# Patient Record
Sex: FEMALE | Race: WHITE | NOT HISPANIC OR LATINO | Employment: PART TIME
[De-identification: names, ages, dates, MRNs, and addresses within clinical notes are randomized per-mention and may not be internally consistent; named-entity substitution may affect disease eponyms.]

---

## 2024-09-14 ENCOUNTER — HEALTH MAINTENANCE LETTER (OUTPATIENT)
Age: 24
End: 2024-09-14

## 2024-09-23 ENCOUNTER — OFFICE VISIT (OUTPATIENT)
Dept: FAMILY MEDICINE | Facility: CLINIC | Age: 24
End: 2024-09-23
Payer: COMMERCIAL

## 2024-09-23 ENCOUNTER — MYC MEDICAL ADVICE (OUTPATIENT)
Dept: FAMILY MEDICINE | Facility: CLINIC | Age: 24
End: 2024-09-23

## 2024-09-23 VITALS
BODY MASS INDEX: 22.37 KG/M2 | DIASTOLIC BLOOD PRESSURE: 73 MMHG | RESPIRATION RATE: 16 BRPM | SYSTOLIC BLOOD PRESSURE: 116 MMHG | HEART RATE: 75 BPM | WEIGHT: 139.2 LBS | HEIGHT: 66 IN | OXYGEN SATURATION: 99 % | TEMPERATURE: 98.1 F

## 2024-09-23 DIAGNOSIS — R30.0 DYSURIA: ICD-10-CM

## 2024-09-23 DIAGNOSIS — N76.0 BACTERIAL VAGINOSIS: ICD-10-CM

## 2024-09-23 DIAGNOSIS — B96.89 BACTERIAL VAGINOSIS: ICD-10-CM

## 2024-09-23 DIAGNOSIS — Z12.4 CERVICAL CANCER SCREENING: ICD-10-CM

## 2024-09-23 DIAGNOSIS — Z00.00 ROUTINE GENERAL MEDICAL EXAMINATION AT A HEALTH CARE FACILITY: Primary | ICD-10-CM

## 2024-09-23 PROBLEM — H53.413: Status: ACTIVE | Noted: 2021-09-28

## 2024-09-23 LAB
ALBUMIN UR-MCNC: NEGATIVE MG/DL
APPEARANCE UR: CLEAR
BACTERIA #/AREA URNS HPF: ABNORMAL /HPF
BILIRUB UR QL STRIP: NEGATIVE
CLUE CELLS: PRESENT
COLOR UR AUTO: YELLOW
GLUCOSE UR STRIP-MCNC: NEGATIVE MG/DL
HGB UR QL STRIP: NEGATIVE
KETONES UR STRIP-MCNC: NEGATIVE MG/DL
LEUKOCYTE ESTERASE UR QL STRIP: NEGATIVE
MUCOUS THREADS #/AREA URNS LPF: PRESENT /LPF
NITRATE UR QL: POSITIVE
PH UR STRIP: 7.5 [PH] (ref 5–7)
RBC #/AREA URNS AUTO: ABNORMAL /HPF
SP GR UR STRIP: 1.02 (ref 1–1.03)
SQUAMOUS #/AREA URNS AUTO: ABNORMAL /LPF
TRICHOMONAS, WET PREP: ABNORMAL
UROBILINOGEN UR STRIP-ACNC: 0.2 E.U./DL
WBC #/AREA URNS AUTO: ABNORMAL /HPF
WBC CLUMPS #/AREA URNS HPF: PRESENT /HPF
WBC'S/HIGH POWER FIELD, WET PREP: ABNORMAL
YEAST, WET PREP: ABNORMAL

## 2024-09-23 PROCEDURE — 87086 URINE CULTURE/COLONY COUNT: CPT | Performed by: FAMILY MEDICINE

## 2024-09-23 PROCEDURE — 87210 SMEAR WET MOUNT SALINE/INK: CPT | Performed by: FAMILY MEDICINE

## 2024-09-23 PROCEDURE — G0145 SCR C/V CYTO,THINLAYER,RESCR: HCPCS | Performed by: FAMILY MEDICINE

## 2024-09-23 PROCEDURE — 81001 URINALYSIS AUTO W/SCOPE: CPT | Performed by: FAMILY MEDICINE

## 2024-09-23 PROCEDURE — 99385 PREV VISIT NEW AGE 18-39: CPT | Performed by: FAMILY MEDICINE

## 2024-09-23 ASSESSMENT — PAIN SCALES - GENERAL: PAINLEVEL: NO PAIN (0)

## 2024-09-23 NOTE — PROGRESS NOTES
Preventive Care Visit  [facility]  Jaswant Cano , Family Medicine  Sep 23, 2024      Assessment & Plan     Routine general medical examination at a health care facility  -Deferred HIV, hep C and vaccinations today. Will get at another time. Hx of needle phobia.    Cervical cancer screening  - Pap Screen Reflex to HPV if ASCUS - Recommended Age 25 - 29 Years    Dysuria  -Symptoms started yesterday. Discussed UA and wet prep. Hx of low level urinary/genital discomfort, discussed could be interstitial cystitis symptoms. Discussed GC/Chl screen today as well, deferred by patient. Consider urology referral if work-up negative and symptoms persistent.  - UA Macroscopic with reflex to Microscopic and Culture - Lab Collect  - Wet prep - Clinic Collect              Counseling  Appropriate preventive services were addressed with this patient via screening, questionnaire, or discussion as appropriate for fall prevention, nutrition, physical activity, Tobacco-use cessation, social engagement, weight loss and cognition.  Checklist reviewing preventive services available has been given to the patient.  Reviewed patient's diet, addressing concerns and/or questions.   She is at risk for psychosocial distress and has been provided with information to reduce risk.           Debo Monreal is a 24 year old, presenting for the following:  Physical (Urgency for urination.)        9/23/2024     7:33 AM   Additional Questions   Roomed by Rachel OVERTON        Health Care Directive  Patient does not have a Health Care Directive or Living Will: Discussed advance care planning with patient; information given to patient to review.    HPI    Other concerns:  Yesterday started having slight urinary urgency and pain. Hx of similar 4 years but self resolved. No abnormal vaginal discharge. Sexually active, one partner. States sometimes feels like there is always low level of urinary discomfort. No genital  ig.                9/22/2024   General Health   How would you rate your overall physical health? Excellent   Feel stress (tense, anxious, or unable to sleep) Only a little      (!) STRESS CONCERN      9/22/2024   Nutrition   Three or more servings of calcium each day? Yes   Diet: Other   If other, please elaborate: Dairy free   How many servings of fruit and vegetables per day? 4 or more   How many sweetened beverages each day? 0-1            9/22/2024   Exercise   Days per week of moderate/strenous exercise 6 days   Average minutes spent exercising at this level 70 min            9/22/2024   Social Factors   Frequency of gathering with friends or relatives Twice a week   Worry food won't last until get money to buy more No   Food not last or not have enough money for food? No   Do you have housing? (Housing is defined as stable permanent housing and does not include staying ouside in a car, in a tent, in an abandoned building, in an overnight shelter, or couch-surfing.) Yes   Are you worried about losing your housing? No   Lack of transportation? No   Unable to get utilities (heat,electricity)? No            9/22/2024   Dental   Dentist two times every year? Yes            9/22/2024   TB Screening   Were you born outside of the [place]? No            Today's PHQ-2 Score:       9/22/2024    12:59 PM   PHQ-2 ( 1999 [employer])   Q1: Little interest or pleasure in doing things 0   Q2: Feeling down, depressed or hopeless 0   PHQ-2 Score 0   Q1: Little interest or pleasure in doing things Not at all   Q2: Feeling down, depressed or hopeless Not at all   PHQ-2 Score 0           9/22/2024   Substance Use   Alcohol more than 3/day or more than 7/wk Not Applicable   Do you use any other substances recreationally? No        Social History     Tobacco Use    Smoking status: Never    Smokeless tobacco: Never   Vaping Use    Vaping status: Never Used   Substance Use Topics    Alcohol use: Never    Drug use: Never                     "9/22/2024   One time HIV Screening   Previous HIV test? Yes          9/22/2024   STI Screening   New sexual partner(s) since last STI/HIV test? No        History of abnormal Pap smear: No - age 21-29 PAP every 3 years recommended             9/22/2024   Contraception/Family Planning   Questions about contraception or family planning No           Reviewed and updated as needed this visit by Provider   Tobacco   Meds    Surg Hx  Fam Hx  Soc Hx Sexual Activity                   Objective    Exam  /73   Pulse 75   Temp 98.1  F (36.7  C) (Temporal)   Resp 16   Ht 1.673 m (5' 5.87\")   Wt 63.1 kg (139 lb 3.2 oz)   LMP 09/14/2024   SpO2 99%   BMI 22.56 kg/m     Estimated body mass index is 22.56 kg/m  as calculated from the following:    Height as of this encounter: 1.673 m (5' 5.87\").    Weight as of this encounter: 63.1 kg (139 lb 3.2 oz).    Physical Exam  GENERAL: alert and no distress  EYES: Eyes grossly normal to inspection, PERRL and conjunctivae and sclerae normal  HENT: ear canals and TM's normal, nose and mouth without ulcers or lesions  NECK: no adenopathy, no asymmetry, masses, or scars  RESP: lungs clear to auscultation - no rales, rhonchi or wheezes  CV: regular rate and rhythm, normal S1 S2, no S3 or S4, no murmur, click or rub, no peripheral edema  ABDOMEN: soft, nontender, no hepatosplenomegaly, no masses and bowel sounds normal   (female): normal female external genitalia, normal urethral meatus , normal vaginal mucosa, and normal cervix. No genital rash or signs of irritation.  MS: no gross musculoskeletal defects noted, no edema  SKIN: no suspicious lesions or rashes  NEURO: Normal strength and tone, mentation intact and speech normal  PSYCH: mentation appears normal, affect normal/bright        Signed Electronically by: Jaswant Cano DO    "

## 2024-09-23 NOTE — PATIENT INSTRUCTIONS
Patient Education   Preventive Care Advice   This is general advice given by our system to help you stay healthy. However, your care team may have specific advice just for you. Please talk to your care team about your preventive care needs.  Nutrition  Eat 5 or more servings of fruits and vegetables each day.  Try wheat bread, brown rice and whole grain pasta (instead of white bread, rice, and pasta).  Get enough calcium and vitamin D. Check the label on foods and aim for 100% of the RDA (recommended daily allowance).  Lifestyle  Exercise at least 150 minutes each week  (30 minutes a day, 5 days a week).  Do muscle strengthening activities 2 days a week. These help control your weight and prevent disease.  No smoking.  Wear sunscreen to prevent skin cancer.  Have a dental exam and cleaning every 6 months.  Yearly exams  See your health care team every year to talk about:  Any changes in your health.  Any medicines your care team has prescribed.  Preventive care, family planning, and ways to prevent chronic diseases.  Shots (vaccines)   HPV shots (up to age 26), if you've never had them before.  Hepatitis B shots (up to age 59), if you've never had them before.  COVID-19 shot: Get this shot when it's due.  Flu shot: Get a flu shot every year.  Tetanus shot: Get a tetanus shot every 10 years.  Pneumococcal, hepatitis A, and RSV shots: Ask your care team if you need these based on your risk.  Shingles shot (for age 50 and up)  General health tests  Diabetes screening:  Starting at age 35, Get screened for diabetes at least every 3 years.  If you are younger than age 35, ask your care team if you should be screened for diabetes.  Cholesterol test: At age 39, start having a cholesterol test every 5 years, or more often if advised.  Bone density scan (DEXA): At age 50, ask your care team if you should have this scan for osteoporosis (brittle bones).  Hepatitis C: Get tested at least once in your life.  STIs (sexually  transmitted infections)  Before age 24: Ask your care team if you should be screened for STIs.  After age 24: Get screened for STIs if you're at risk. You are at risk for STIs (including HIV) if:  You are sexually active with more than one person.  You don't use condoms every time.  You or a partner was diagnosed with a sexually transmitted infection.  If you are at risk for HIV, ask about PrEP medicine to prevent HIV.  Get tested for HIV at least once in your life, whether you are at risk for HIV or not.  Cancer screening tests  Cervical cancer screening: If you have a cervix, begin getting regular cervical cancer screening tests starting at age 21.  Breast cancer scan (mammogram): If you've ever had breasts, begin having regular mammograms starting at age 40. This is a scan to check for breast cancer.  Colon cancer screening: It is important to start screening for colon cancer at age 45.  Have a colonoscopy test every 10 years (or more often if you're at risk) Or, ask your provider about stool tests like a FIT test every year or Cologuard test every 3 years.  To learn more about your testing options, visit:   .  For help making a decision, visit:   https://bit.ly/bb54962.  Prostate cancer screening test: If you have a prostate, ask your care team if a prostate cancer screening test (PSA) at age 55 is right for you.  Lung cancer screening: If you are a current or former smoker ages 50 to 80, ask your care team if ongoing lung cancer screenings are right for you.  For informational purposes only. Not to replace the advice of your health care provider. Copyright   2023 [facility]. All rights reserved. Clinically reviewed by the [facility]. [employer] [id number] - REV 01/24.       Thank you for coming to see me today! Here are a couple of pieces of information about my schedule and communication practices.    I am not in the clinic on Fridays. Non-urgent calls and messages received  on Fridays will be addressed as soon as I am able when I am back in the office on the next business day. Urgent calls will be addressed by a covering clinician.      If lab work was done today as part of your evaluation you will generally be contacted via Dr. Tariff, mail, or phone with the results within 7-10 days.  If there is an alarming/concerning result we will contact you by phone. Lab results come back at varying times, I generally wait until all labs are resulted before making comments on results. Please note, labs are automatically released to Dr. Tariff once available, but it may take a couple of days for my interpretation note to appear.    I try very hard to respond to medical messages with 2 business days of receiving them. Occasionally it takes me longer if I am trying to figure out the best way to respond and need to seek guidance, do some research or dig deeper into your medical history to come up with a helpful response.    If you need refills please contact your pharmacist. They will send a refill request to me to review. Please allow 3-5 business days for us to respond to all refill requests.    Call or send a medical message with any questions or concerns. Please understand that I may ask you to schedule a follow-up appointment to properly address your questions or concerns.    Thank you for trusting me to be part of your healthcare team!    Jaswant Cano, DO

## 2024-09-24 ENCOUNTER — TELEPHONE (OUTPATIENT)
Dept: FAMILY MEDICINE | Facility: CLINIC | Age: 24
End: 2024-09-24
Payer: COMMERCIAL

## 2024-09-24 DIAGNOSIS — N76.0 BACTERIAL VAGINOSIS: Primary | ICD-10-CM

## 2024-09-24 DIAGNOSIS — B96.89 BACTERIAL VAGINOSIS: Primary | ICD-10-CM

## 2024-09-24 LAB — BACTERIA UR CULT: NORMAL

## 2024-09-24 RX ORDER — METRONIDAZOLE 500 MG/1
500 TABLET ORAL 2 TIMES DAILY
Qty: 14 TABLET | Refills: 0 | Status: SHIPPED | OUTPATIENT
Start: 2024-09-24 | End: 2024-10-01

## 2024-09-24 RX ORDER — METRONIDAZOLE 500 MG/1
500 TABLET ORAL 2 TIMES DAILY
Qty: 14 TABLET | Refills: 0 | Status: CANCELLED | OUTPATIENT
Start: 2024-09-24 | End: 2024-10-01

## 2024-09-24 NOTE — TELEPHONE ENCOUNTER
Writer called and spoke with patient. Relayed message from Dr. Cano regarding lab results.   Patient would like script sent to the [facility].     ----- Message from Jaswant Cano sent at 9/24/2024 12:18 PM CDT -----  Pls call patient.  UA negative for UTI.  Wet prep positive for BV. I will send in abx. Current pharmacy listed cannot have scripts e-prescribed. Pls have pt select new pharmacy for prescription.    Thank you,   Lorraine Mcgill, ESPERANZAN RN  [facility]

## 2024-09-25 NOTE — TELEPHONE ENCOUNTER
Writer replied to patient via [employer].  ESPERANZA KramerN, RN (she/her)  [facility] RN

## 2024-09-26 LAB
BKR LAB AP GYN ADEQUACY: NORMAL
BKR LAB AP GYN INTERPRETATION: NORMAL
BKR LAB AP HPV REFLEX: NORMAL
BKR LAB AP LMP: NORMAL
BKR LAB AP PREVIOUS ABNORMAL: NORMAL
PATH REPORT.COMMENTS IMP SPEC: NORMAL
PATH REPORT.COMMENTS IMP SPEC: NORMAL
PATH REPORT.RELEVANT HX SPEC: NORMAL

## 2024-09-30 ENCOUNTER — TELEPHONE (OUTPATIENT)
Dept: FAMILY MEDICINE | Facility: CLINIC | Age: 24
End: 2024-09-30
Payer: COMMERCIAL

## 2024-09-30 NOTE — TELEPHONE ENCOUNTER
Yes can certainly try that.   If symptoms persist she should be re-evaluated to make sure no associated STI as she declined testing    Ronit Luevano DNP

## 2024-09-30 NOTE — TELEPHONE ENCOUNTER
Covering clinicians-Please review and advise if it would be reasonable for patient to try over-the-counter A&D ointment to local irritation/discomfort?    Call received from patient:  On day 7 of antibiotics for bacterial vaginosis  Symptoms have improved and still present  Constant burning and discomfort  No bleeding  No discharge  Wondering if needs more antibiotic?  Is there anything she can use topically?    Writer discussed with patient it is reassuring symptoms are improved; antibiotic will continue to circulate in the body even after the last dose.  If, at any point, symptoms are worsening, call back and ask to speak with a nurse.  If symptoms fail to resolve by end of this week, call back to speak with a nurse.    Writer will ask covering clinicians if over-the-counter A&D ointment would be an option for local topical application.    Patient verbalized understanding and in agreement with plan.    Thank you!  ESPERANZA DomínguezN, RN-[facility]

## 2024-10-08 ENCOUNTER — NURSE TRIAGE (OUTPATIENT)
Dept: FAMILY MEDICINE | Facility: CLINIC | Age: 24
End: 2024-10-08
Payer: COMMERCIAL

## 2024-10-08 ENCOUNTER — OFFICE VISIT (OUTPATIENT)
Dept: URGENT CARE | Facility: URGENT CARE | Age: 24
End: 2024-10-08
Payer: COMMERCIAL

## 2024-10-08 VITALS
TEMPERATURE: 99 F | RESPIRATION RATE: 14 BRPM | HEART RATE: 84 BPM | SYSTOLIC BLOOD PRESSURE: 123 MMHG | OXYGEN SATURATION: 100 % | DIASTOLIC BLOOD PRESSURE: 75 MMHG

## 2024-10-08 DIAGNOSIS — R10.2 VAGINAL PAIN: Primary | ICD-10-CM

## 2024-10-08 DIAGNOSIS — N76.0 VAGINITIS AND VULVOVAGINITIS: ICD-10-CM

## 2024-10-08 LAB
ALBUMIN UR-MCNC: NEGATIVE MG/DL
APPEARANCE UR: CLEAR
BACTERIA #/AREA URNS HPF: ABNORMAL /HPF
BILIRUB UR QL STRIP: NEGATIVE
CLUE CELLS: ABNORMAL
COLOR UR AUTO: YELLOW
GLUCOSE UR STRIP-MCNC: NEGATIVE MG/DL
HGB UR QL STRIP: NEGATIVE
KETONES UR STRIP-MCNC: NEGATIVE MG/DL
LEUKOCYTE ESTERASE UR QL STRIP: ABNORMAL
NITRATE UR QL: NEGATIVE
PH UR STRIP: 7 [PH] (ref 5–7)
RBC #/AREA URNS AUTO: ABNORMAL /HPF
SP GR UR STRIP: 1.01 (ref 1–1.03)
SQUAMOUS #/AREA URNS AUTO: ABNORMAL /LPF
TRICHOMONAS, WET PREP: ABNORMAL
UROBILINOGEN UR STRIP-ACNC: 0.2 E.U./DL
WBC #/AREA URNS AUTO: ABNORMAL /HPF
WBC CLUMPS #/AREA URNS HPF: PRESENT /HPF
WBC'S/HIGH POWER FIELD, WET PREP: ABNORMAL
YEAST, WET PREP: ABNORMAL

## 2024-10-08 PROCEDURE — 99000 SPECIMEN HANDLING OFFICE-LAB: CPT | Performed by: NURSE PRACTITIONER

## 2024-10-08 PROCEDURE — 81001 URINALYSIS AUTO W/SCOPE: CPT | Performed by: NURSE PRACTITIONER

## 2024-10-08 PROCEDURE — 87591 N.GONORRHOEAE DNA AMP PROB: CPT | Performed by: NURSE PRACTITIONER

## 2024-10-08 PROCEDURE — 99214 OFFICE O/P EST MOD 30 MIN: CPT | Performed by: NURSE PRACTITIONER

## 2024-10-08 PROCEDURE — 87210 SMEAR WET MOUNT SALINE/INK: CPT | Performed by: NURSE PRACTITIONER

## 2024-10-08 PROCEDURE — 87798 DETECT AGENT NOS DNA AMP: CPT | Mod: 90 | Performed by: NURSE PRACTITIONER

## 2024-10-08 PROCEDURE — 87491 CHLMYD TRACH DNA AMP PROBE: CPT | Performed by: NURSE PRACTITIONER

## 2024-10-08 PROCEDURE — 87088 URINE BACTERIA CULTURE: CPT | Performed by: NURSE PRACTITIONER

## 2024-10-08 PROCEDURE — 87086 URINE CULTURE/COLONY COUNT: CPT | Performed by: NURSE PRACTITIONER

## 2024-10-08 PROCEDURE — 87563 M. GENITALIUM AMP PROBE: CPT | Mod: 90 | Performed by: NURSE PRACTITIONER

## 2024-10-08 RX ORDER — DOXYCYCLINE 100 MG/1
100 CAPSULE ORAL 2 TIMES DAILY
Qty: 14 CAPSULE | Refills: 0 | Status: SHIPPED | OUTPATIENT
Start: 2024-10-08 | End: 2024-10-15

## 2024-10-08 RX ORDER — BENZOCAINE/MENTHOL 6 MG-10 MG
LOZENGE MUCOUS MEMBRANE 2 TIMES DAILY PRN
Qty: 14 G | Refills: 0 | Status: SHIPPED | OUTPATIENT
Start: 2024-10-08 | End: 2024-10-15

## 2024-10-08 RX ORDER — FLUOCINOLONE ACETONIDE 0.1 MG/G
CREAM TOPICAL 2 TIMES DAILY PRN
Qty: 15 G | Refills: 0 | Status: SHIPPED | OUTPATIENT
Start: 2024-10-08 | End: 2024-10-08

## 2024-10-08 NOTE — TELEPHONE ENCOUNTER
I reviewed this with pt.  She said her partner was tested last week for STIs. Everything was negative. (No other partners for either).  Does pt still need to be tested?    OK to leave a detailed message.  CARITO Vyas

## 2024-10-08 NOTE — TELEPHONE ENCOUNTER
Pt needs to be seen in-person testing. Maybe STI that was not initially tested for.  Pls offer same or next day appointment.  Pt can also get seen in UC.    Jaswant Cano, DO

## 2024-10-08 NOTE — TELEPHONE ENCOUNTER
Relayed clinician recommendation. Patient in agreement to be seen in UC for testing.    ESPERANZA KramerN, RN (she/her)  [facility] RN

## 2024-10-08 NOTE — TELEPHONE ENCOUNTER
"Care advice:  See in office today or tomorrow.    Disposition:  Patient is requesting if provider can send a different antibiotics or continual treatment with same antibiotic longer.  Declined appt when offered.    Will route encounter to provider for review and advice.    Víctor Friedman RN  [facility]      Reason for Disposition   MODERATE-SEVERE itching (i.e., interferes with school, work, or sleep)    Additional Information   Negative: Sounds like a life-threatening emergency to the triager   Negative: Followed a genital area injury (e.g., vagina, vulva)   Negative: Vaginal bleeding is main symptom   Negative: Vaginal discharge is main symptom   Negative: Pain or burning with passing urine (urination) is main symptom   Negative: Menstrual cramps is main symptom   Negative: Abdomen pain is main symptom   Negative: Pubic lice suspected   Negative: Itching or rash of female genital area (e.g., labia, vagina, vulva)   Negative: Pregnant and labor suspected   Negative: Patient sounds very sick or weak to the triager   Negative: SEVERE pain and not improved 2 hours after pain medicine   Negative: Genital area looks infected (e.g., draining sore, spreading redness) and fever   Negative: Something is hanging out of the vagina and can't easily be pushed back inside   Negative: MILD-MODERATE pain and present > 24 hours  (Exception: Chronic pain.)   Negative: Genital area looks infected (e.g., draining sore, spreading redness)   Negative: Rash with painful tiny water blisters    Answer Assessment - Initial Assessment Questions  1. SYMPTOM: \"What's the main symptom you're concerned about?\" (e.g., pain, itching, dryness)      Burning, itching and pain in general in the vagina.     2. LOCATION: \"Where is the  symptoms located?\" (e.g., inside/outside, left/right)      Both.  Symptoms started prior to last appointment with Dr. Cano on 9/23/2024.  All symptoms started on 9/2/24.    3. ONSET: \"When did the  " "9/22/2024  start?\"      9/22/2024.  Patient was prescribed Flagyl by Dr. Cano.  Symptoms not completely gone.    4. PAIN: \"Is there any pain?\" If Yes, ask: \"How bad is it?\" (Scale: 1-10; mild, moderate, severe)    -  MILD (1-3): Doesn't interfere with normal activities.     -  MODERATE (4-7): Interferes with normal activities (e.g., work or school) or awakens from sleep.      -  SEVERE (8-10): Excruciating pain, unable to do any normal activities.      More discomfort than pain.  This discomfort comes and goes and fluctuates between a 2-8/10. .     5. ITCHING: \"Is there any itching?\" If Yes, ask: \"How bad is it?\" (Scale: 1-10; mild, moderate, severe)      Yes.  Less itching than discomfort.    6. CAUSE: \"What do you think is causing the discharge?\" \"Have you had the same problem before? What happened then?\"      Recently diagnosed with BV from provider visit on 19/23/24.     7. OTHER SYMPTOMS: \"Do you have any other symptoms?\" (e.g., fever, itching, vaginal bleeding, pain with urination, injury to genital area, vaginal foreign body)      Itching.  Caught a cold a few days ago and the fever before maybe related to the cold  Cold is now resolved.    8. PREGNANCY: \"Is there any chance you are pregnant?\" \"When was your last menstrual period?\"      Denied.  26 days.    Protocols used: Vaginal Symptoms-A-OH    "

## 2024-10-08 NOTE — PROGRESS NOTES
Chief Complaint   Patient presents with    Urgent Care    Vaginal Problem     Patient presents with vaginal discomfort and burning with some mild itching for the past 2x weeks. Patient was seen in  where she was treated for bv but is still having symptoms.      SUBJECTIVE:  Jocelin Pelaez is a 24 year old female who presents today with vaginal discomfort burning itch swelling redness tenderness over the last several weeks.  Was treated for BV without much improvement.  Tried OTC boric acid and probiotics.  Monogamous with same partner.  Declines fever sweats chills nausea vomiting.  Does endorse mild low back pain.  No new detergents soaps bath bombs bladder irritants.    No past medical history on file.  Current Outpatient Medications   Medication Sig Dispense Refill    doxycycline hyclate (VIBRAMYCIN) 100 MG capsule Take 1 capsule (100 mg) by mouth 2 times daily for 7 days. 14 capsule 0    hydrocortisone (CORTAID) 1 % external cream Apply topically 2 times daily as needed for itching (vagina swelling pain). Pea sized amount 14 g 0     Social History     Tobacco Use    Smoking status: Never    Smokeless tobacco: Never   Substance Use Topics    Alcohol use: Never     No Known Allergies    Review of Systems  All systems negative except for those listed above in HPI.    OBJECTIVE:  /75 (BP Location: Right arm)   Pulse 84   Temp 99  F (37.2  C) (Oral)   Resp 14   LMP 09/14/2024   SpO2 100%   Physical Exam  Constitutional:       General: She is not in acute distress.     Appearance: She is well-developed. She is not diaphoretic.   Cardiovascular:      Pulses: Normal pulses.   Abdominal:      General: Bowel sounds are normal.      Palpations: Abdomen is soft.      Tenderness: There is no abdominal tenderness.   Genitourinary:     Comments: Labia with moderate erythema edema dry rash  Musculoskeletal:         General: Normal range of motion.   Skin:     General: Skin is warm and dry.      Capillary  Refill: Capillary refill takes less than 2 seconds.      Coloration: Skin is not pale.   Neurological:      General: No focal deficit present.      Mental Status: She is alert and oriented to person, place, and time.   Psychiatric:         Mood and Affect: Mood normal.         Behavior: Behavior normal.       Results for orders placed or performed in visit on 10/08/24   UA Macroscopic with reflex to Microscopic and Culture - Clinic Collect     Status: Abnormal    Specimen: Urine, Midstream   Result Value Ref Range    Color Urine Yellow Colorless, Straw, Light Yellow, Yellow    Appearance Urine Clear Clear    Glucose Urine Negative Negative mg/dL    Bilirubin Urine Negative Negative    Ketones Urine Negative Negative mg/dL    Specific Gravity Urine 1.010 1.003 - 1.035    Blood Urine Negative Negative    pH Urine 7.0 5.0 - 7.0    Protein Albumin Urine Negative Negative mg/dL    Urobilinogen Urine 0.2 0.2, 1.0 E.U./dL    Nitrite Urine Negative Negative    Leukocyte Esterase Urine Moderate (A) Negative   Urine Microscopic Exam     Status: Abnormal   Result Value Ref Range    Bacteria Urine Few (A) None Seen /HPF    RBC Urine 2-5 (A) 0-2 /HPF /HPF    WBC Urine 10-25 (A) 0-5 /HPF /HPF    Squamous Epithelials Urine Few (A) None Seen /LPF    WBC Clumps Urine Present (A) None Seen /HPF   Wet prep - Clinic Collect     Status: Abnormal    Specimen: Vagina; Swab   Result Value Ref Range    Trichomonas Absent Absent    Yeast Absent Absent    Clue Cells Absent Absent    WBCs/high power field 1+ (A) None     ASSESSMENT:    ICD-10-CM    1. Vaginal pain  R10.2 UA Macroscopic with reflex to Microscopic and Culture - Clinic Collect     Wet prep - Clinic Collect     Chlamydia trachomatis/Neisseria gonorrhoeae by PCR - Clinic Collect     Urine Microscopic Exam     Urine Culture     Urogenital Ureaplasma and Mycoplasma Species by PCR     hydrocortisone (CORTAID) 1 % external cream     DISCONTINUED: fluocinolone (SYNALAR) 0.01 % external  cream      2. Vaginitis and vulvovaginitis  N76.0 doxycycline hyclate (VIBRAMYCIN) 100 MG capsule     hydrocortisone (CORTAID) 1 % external cream     DISCONTINUED: fluocinolone (SYNALAR) 0.01 % external cream        PLAN:     Doxycycline for empiric treatment of urethritis/vaginitis, patient prefers empiric treatment while we wait on tests  Chlamydia gonorrhea pending, we call for positives  Ureaplasma mycoplasma pending as well  Urine with slight discrepancies, culture pending  Wet prep without BV yeast trichomoniasis  Discussed bladder irritants and urogenital hygiene  Would recommend topical steroid cream pea-sized amount as needed twice daily for no more than 2 weeks  Witch hazel wet wipes cool compress  PCP or Guynn if lingering or concerns  ER if severe worsening symptoms of pain fevers vomiting dizziness    Follow up with primary care provider with any problems, questions or concerns or if symptoms worsen or fail to improve. Patient agreed to plan and verbalized understanding.    Agustina Hdz, MILVIA-BC  [facility]

## 2024-10-08 NOTE — PATIENT INSTRUCTIONS
Doxycycline for empiric treatment of urethritis/vaginitis empirically while we wait on test results  Chlamydia gonorrhea pending, we call for positives  Ureaplasma mycoplasma pending as well  Urine with slight discrepancies, culture pending  Wet prep without BV yeast trichomoniasis  Discussed bladder irritants and urogenital hygiene  Would recommend topical steroid cream pea-sized amount as needed twice daily for no more than 2 weeks  Witch hazel wet wipes cool compress  PCP or Guynn if lingering or concerns  ER if severe worsening symptoms of pain fevers vomiting dizziness

## 2024-10-09 LAB
C TRACH DNA SPEC QL PROBE+SIG AMP: NEGATIVE
N GONORRHOEA DNA SPEC QL NAA+PROBE: NEGATIVE

## 2024-10-10 DIAGNOSIS — B95.1 GROUP B STREPTOCOCCAL UTI: Primary | ICD-10-CM

## 2024-10-10 DIAGNOSIS — N39.0 GROUP B STREPTOCOCCAL UTI: Primary | ICD-10-CM

## 2024-10-10 LAB
BACTERIA UR CULT: ABNORMAL
BACTERIA UR CULT: ABNORMAL
M GENITALIUM DNA SPEC QL NAA+PROBE: NOT DETECTED
M HOMINIS DNA SPEC QL NAA+PROBE: NOT DETECTED
U PARVUM DNA SPEC QL NAA+PROBE: NOT DETECTED
U UREALYTICUM DNA SPEC QL NAA+PROBE: NOT DETECTED

## 2024-10-10 RX ORDER — AMOXICILLIN 500 MG/1
500 CAPSULE ORAL 3 TIMES DAILY
Qty: 15 CAPSULE | Refills: 0 | Status: SHIPPED | OUTPATIENT
Start: 2024-10-10 | End: 2024-10-15

## 2024-10-23 ENCOUNTER — TELEPHONE (OUTPATIENT)
Dept: FAMILY MEDICINE | Facility: CLINIC | Age: 24
End: 2024-10-23
Payer: COMMERCIAL

## 2024-10-23 NOTE — TELEPHONE ENCOUNTER
Reason for Call:  Appointment Request    Patient requesting this type of appt:  Hospital/ED Follow-Up     Requested provider:   Jaswant Cano       Reason patient unable to be scheduled: Not within requested timeframe    When does patient want to be seen/preferred time: 1-2 days    Comments: Patient has been seen twice. She was recently in urgent care again and was given antibiotics which helped but it's all done and she's feeling the symptoms again. Burning sensation and irritation.   She would like to get an appointment in as soon as possible.     Could we send this information to you in [employer] or would you prefer to receive a phone call?:   No preference   Okay to leave a detailed message?: Yes at Home number on file [phone] (home)    Call taken on 10/23/2024 at 6:26 PM by Conrad Mendez

## 2024-10-23 NOTE — TELEPHONE ENCOUNTER
Reason for Call:  Appointment Request    Patient requesting this type of appt:  Hospital/ED Follow-Up     Requested provider: No Ref-Primary, Physician    Reason patient unable to be scheduled: Not within requested timeframe    When does patient want to be seen/preferred time: 1-2 days    Comments: Patient is still experiencing symptoms that were discussed with provider before and patient was in urgent care again.     Could we send this information to you in [facility] or would you prefer to receive a phone call?:   No preference   Okay to leave a detailed message?: Yes at Home number on file [phone] (home)    Call taken on 10/23/2024 at 6:20 PM by Conrad Mendez

## 2024-10-24 NOTE — TELEPHONE ENCOUNTER
"Patient calling for new antibiotic to treat UTI. Keflex started last night, 10/23 after UC visit for UTI. Patient experienced burning, itching, tingling in hands legs and face. Denies difficulty breathing. RN advised per notes from  that she needs to return back to where she was seen to change medication, a message cannot be sent to  provider to change medication and they want to see her back there. RN also offered to transfer for assistance in scheduling with primary care provider. Patient began crying and very upset, stating that \"there is nothing RN can do for her then\" and hung up on writer.     Routing to primary care pool to see if there is any appointments in clinic today and to reach out to patient.      notes 10/24      "

## 2024-10-24 NOTE — TELEPHONE ENCOUNTER
Called patient to get additional information regarding symptoms and treatment. Advised I could see if there is availability for a virtual visit today. Patient hung up without notice.    Closing encounter.    Kirsten Wilkinson RN, BSN, PHN  [facility]

## 2024-10-24 NOTE — TELEPHONE ENCOUNTER
Connected with patient who was seen in UC at another org last night and was dx w/ UTI. She is receiving antibiotics and says at this time does not think visit is necessary.   Writer advised to call if medication does not help, or any other concerns-

## 2024-11-07 ENCOUNTER — OFFICE VISIT (OUTPATIENT)
Dept: FAMILY MEDICINE | Facility: CLINIC | Age: 24
End: 2024-11-07
Payer: COMMERCIAL

## 2024-11-07 VITALS
TEMPERATURE: 99.8 F | BODY MASS INDEX: 21.81 KG/M2 | DIASTOLIC BLOOD PRESSURE: 80 MMHG | SYSTOLIC BLOOD PRESSURE: 132 MMHG | HEART RATE: 84 BPM | HEIGHT: 66 IN | WEIGHT: 135.7 LBS | RESPIRATION RATE: 16 BRPM | OXYGEN SATURATION: 99 %

## 2024-11-07 DIAGNOSIS — R30.0 DYSURIA: ICD-10-CM

## 2024-11-07 DIAGNOSIS — N30.01 ACUTE CYSTITIS WITH HEMATURIA: Primary | ICD-10-CM

## 2024-11-07 LAB
ALBUMIN UR-MCNC: NEGATIVE MG/DL
APPEARANCE UR: CLEAR
BACTERIA #/AREA URNS HPF: ABNORMAL /HPF
BILIRUB UR QL STRIP: NEGATIVE
COLOR UR AUTO: YELLOW
GLUCOSE UR STRIP-MCNC: NEGATIVE MG/DL
HGB UR QL STRIP: ABNORMAL
KETONES UR STRIP-MCNC: NEGATIVE MG/DL
LEUKOCYTE ESTERASE UR QL STRIP: NEGATIVE
NITRATE UR QL: POSITIVE
PH UR STRIP: 7.5 [PH] (ref 5–7)
RBC #/AREA URNS AUTO: ABNORMAL /HPF
SP GR UR STRIP: 1.01 (ref 1–1.03)
SQUAMOUS #/AREA URNS AUTO: ABNORMAL /LPF
UROBILINOGEN UR STRIP-ACNC: 0.2 E.U./DL
WBC #/AREA URNS AUTO: ABNORMAL /HPF

## 2024-11-07 PROCEDURE — 87086 URINE CULTURE/COLONY COUNT: CPT | Performed by: PHYSICIAN ASSISTANT

## 2024-11-07 PROCEDURE — 99214 OFFICE O/P EST MOD 30 MIN: CPT | Performed by: PHYSICIAN ASSISTANT

## 2024-11-07 PROCEDURE — G2211 COMPLEX E/M VISIT ADD ON: HCPCS | Performed by: PHYSICIAN ASSISTANT

## 2024-11-07 PROCEDURE — 81001 URINALYSIS AUTO W/SCOPE: CPT | Performed by: PHYSICIAN ASSISTANT

## 2024-11-07 RX ORDER — AMOXICILLIN 500 MG/1
500 CAPSULE ORAL 2 TIMES DAILY
COMMUNITY
Start: 2024-11-03 | End: 2024-11-07

## 2024-11-07 RX ORDER — NITROFURANTOIN 25; 75 MG/1; MG/1
100 CAPSULE ORAL 2 TIMES DAILY
Qty: 10 CAPSULE | Refills: 0 | Status: SHIPPED | OUTPATIENT
Start: 2024-11-07 | End: 2024-11-12

## 2024-11-07 ASSESSMENT — PAIN SCALES - GENERAL: PAINLEVEL_OUTOF10: NO PAIN (0)

## 2024-11-07 NOTE — PROGRESS NOTES
Assessment & Plan     Acute cystitis with hematuria - uA positive for nitrites, do recommend treatment with abx, will use nitrofurantoin. Culture completed today given recurrent UTI. If symptoms do not resolve with this course of abx, recommend follow up with urology, referral provided.   - Adult Urology  Referral; Future    Dysuria  - UA Macroscopic with reflex to Microscopic and Culture - Lab Collect; Future  - UA Macroscopic with reflex to Microscopic and Culture - Lab Collect  - Urine Microscopic Exam  - Urine Culture  - nitroFURantoin macrocrystal-monohydrate (MACROBID) 100 MG capsule; Take 1 capsule (100 mg) by mouth 2 times daily for 5 days.  - Adult Urology  Referral; Future    The longitudinal plan of care for the diagnosis(es)/condition(s) as documented were addressed during this visit. Due to the added complexity in care, I will continue to support Jocelin in the subsequent management and with ongoing continuity of care.      Debo Monreal is a 24 year old, presenting for the following health issues:  UTI (Since September- been on 4 different antibiotics and nothing has worked )      11/7/2024     2:36 PM   Additional Questions   Roomed by Luzmaria russ     9/23 UTI sx, UA negative, wet prep positive for BV treated with metronidazole  10/8 vag discomfort, burning, itch, redness wet prep/mycoplasma/ureaplasma negative, UA positive group B stre, treated with amoxicillin  Got 90-95% better after finishing amoxicillin but then got worse  10/23 UTI sx, UA small leuk, treated with keflex  10/24 allergic reaction to keflex - but took with benadryl and finished 10 day course  Started amoxicillin 500mg BID purchased from [place] on 11/2 in the PM because of ongoing symptoms  Monday felt well  Tuesday through today has felt worse - urinary urgency, burning, irritation  Menses started on Monday    UTI    History of Present Illness       Reason for visit:  Ongoing UTI symptoms since 9/23    She eats  In an effort to ensure that our patients [employer], a Team Member has reviewed your chart and identified an opportunity to provide the best care possible. An attempt was made to discuss or schedule due or overdue Preventive or Chronic Condition care.    The Outcome was Contact was made, care gap was discussed - see further documentation. Care Gaps identified: Immunizations and Wellness Visits.    Appointment needed: Comprehensive Annual Visit      Mom will call back to schedule   "4 or more servings of fruits and vegetables daily.She consumes 0 sweetened beverage(s) daily.She exercises with enough effort to increase her heart rate 30 to 60 minutes per day.  She exercises with enough effort to increase her heart rate 7 days per week.   She is taking medications regularly.         Objective    /80 (BP Location: Right arm, Patient Position: Sitting, Cuff Size: Adult Regular)   Pulse 84   Temp 99.8  F (37.7  C) (Temporal)   Resp 16   Ht 1.67 m (5' 5.75\")   Wt 61.6 kg (135 lb 11.2 oz)   LMP 11/04/2024 (Exact Date)   SpO2 99%   BMI 22.07 kg/m    Body mass index is 22.07 kg/m .  Physical Exam   GENERAL: alert and no distress  PSYCH: mentation appears normal, affect normal/bright        Signed Electronically by: Katty Vazquez PA-C    "

## 2024-11-08 LAB — BACTERIA UR CULT: NO GROWTH

## 2024-12-31 NOTE — TELEPHONE ENCOUNTER
MEDICAL RECORDS REQUEST   [facility]  [address] [place] [address]  PHONE: [phone]  Fax: [phone]        FUTURE VISIT INFORMATION                                                   Jocelin Pelaez, : 2000 scheduled for future visit at [facility]    APPOINTMENT INFORMATION:  Date: 2025  Provider:  Ericka Dukes MD  Reason for Visit/Diagnosis: recurrent UTI for a month and a half, Dysuria    REFERRAL INFORMATION:  Referring provider:  Katty Vazquez PA-C in [facility]      RECORDS REQUESTED FOR VISIT                                                     NOTES  STATUS/DETAILS   OFFICE NOTE from referring provider  yes, 2024 -- Katty Vazquez PA-C in [facility]   OFFICE NOTE from other specialist  yes   DISCHARGE REPORT from the ER  YES, 10/823/2024 -- THE URGENCY ROOM ED   MEDICATION LIST  yes   LABS     URINALYSIS (UA)  yes     PRE-VISIT CHECKLIST      Joint diagnostic appointment coordinated correctly          (ensure right order & amount of time) Yes   RECORD COLLECTION COMPLETE Yes

## 2025-03-28 ENCOUNTER — PRE VISIT (OUTPATIENT)
Dept: UROLOGY | Facility: CLINIC | Age: 25
End: 2025-03-28

## 2025-08-25 ENCOUNTER — PATIENT OUTREACH (OUTPATIENT)
Dept: CARE COORDINATION | Facility: CLINIC | Age: 25
End: 2025-08-25
Payer: COMMERCIAL